# Patient Record
(demographics unavailable — no encounter records)

---

## 2024-10-22 NOTE — ASSESSMENT
[FreeTextEntry1] : FibroScan/ Vibration Controlled Transient Elastography (VCTE) Report   PROBE SIZE: M   INDICATION: NAFLD/KOROMA   REFERRING PHYSICIAN: Dr. Guillermina Awad     PROCEDURE:   Patient was NPO for 4 hours prior to procedure. After providing oral explanations of the procedure to the patient, patient was placed in supine position with right arm in maximum abduction to allow optimal exposure of right lateral abdomen. Patient abdomen was briefly assessed to identify to the terminus of the xyphoid process. The ideal target testing spot was located mid-line and lateral to this point. To acquire proper signals, the skin to liver capsule distance was accessed with the FibroScan probe. Patient was instructed to breathe normally and to abstain from sudden movements during the procedure. Ten shear waves were produced and measurements of the speed of each wave evaluated. Patient tolerated the procedure well and was discharged without incident.     FINDINGS:   - Median shear wave speed of 1.77 meters/second.   - This corresponds to a median Liver Stiffness Score of 9.4 kPa.   - IQR/med 30%   -  dB/m   RESULTS based on  reference scales:   Findings inconclusive. Consider MR Elastography for further evaluation if clinically indicated.   The results regarding fibrosis stage and/or steatosis grade are based on current published scientific literature and the provided patient's diagnosis. Any further medical or surgical intervention should be made by considering the overall medical condition of the patient.   Please note transient elastography does not directly measure liver fibrosis and fat content, therefore over-estimation of liver fibrosis may be observed in several conditions, including but not limited to: liver congestion, active hepatitis, ascites, mass within the liver. Clinical correlation is necessary, and liver stiffness readings need to be interpreted with consideration to these potential confounding factors.

## 2024-12-31 NOTE — ASSESSMENT
[FreeTextEntry1] : Krystina is a 62 F who is presenting for follow up visit for MASLD and liver cysts.  1. Liver cysts: given size on MRI patient to follow up with surgeon to assess if cysts need to be removed -Follow up already scheduled  2. MASH: chronic liver disease largely unremarkable. MR elastography without any e/o fibrosis -Continue lifestyle modifications -Plan to repat MR elastography Q1-2 years   Follow up in 6 months for repeat LFTs

## 2024-12-31 NOTE — HISTORY OF PRESENT ILLNESS
[FreeTextEntry1] : Krystina is a 62 F who is presenting for follow up visit for MASLD and liver cysts. Since last visit she had a fibroscan which was inconclusive due to high IQR, likely related to liver cysts. She then underwent and MR elastography which showed hepatic steatosis with no liver stiffness. She has overall been well, some fatigue. She is to follow up with a surgeon for her liver cysts and also found to have a hemorrhagic renal cyst for which she is following up with a urologist. She is otherwise doing well.

## 2025-01-15 NOTE — ASSESSMENT
[FreeTextEntry1] : Urine studies are ordered to screen for microhematuria. Discussed with patient at this time her creatinine is normal. Will follow up patient's renal cysts with annual ultrasounds.

## 2025-01-15 NOTE — HISTORY OF PRESENT ILLNESS
[FreeTextEntry1] : Patient presents with a complaint of bilateral renal cysts. Patient followed up with her surgeon for her liver cysts and was found to have renal cysts and a left hemorrhagic renal cyst on MRI with and without contrast. She is currently asymptomatic and has no urinary complaints.

## 2025-01-23 NOTE — DISCUSSION/SUMMARY
[FreeTextEntry1] : 62-year-old female with PAF status post RFCA PVAI (5/2024) who is doing well, not on AAD -[id number] 0 not on oac, encouraged weight loss -If patient has recurrence of A-fib we can consider Tikosyn -follow with dr Harrison  Patient expressed understanding of the discussion and recommendations. I spent a total of 45 minutes on the encounter evaluating and discussing treatment options with the patient, as well as counseling and coordination of care as stated above. [EKG obtained to assist in diagnosis and management of assessed problem(s)] : EKG obtained to assist in diagnosis and management of assessed problem(s)

## 2025-01-23 NOTE — HISTORY OF PRESENT ILLNESS
[FreeTextEntry1] : 62-year-old female with symptomatic PAF RVR episodes despite being on Multaq subsequently amiodarone (no longer taking any AAD). Patient underwent RFCA PVAI (5.29.24). Prior TTE shows normal LVEF. Zio from 8.2024 14d monitoring showed SR, no AF, rare APC, PVC. Occasionally feels very brief palpitations lasting few seconds, otherwise doing well. No sustained palpitations, cp, sob, dizziness, remains in SR. Denies htn, dm, cad.  chadsvasc 0 1.23.25 ecg sr 80 bpm 8.15.24 ecg shows SR 75 bpm 6.25.24 ecg SR 70 bpm

## 2025-02-06 NOTE — PHYSICAL EXAM
[Normal Appearance] : normal appearance [Well Groomed] : well groomed [General Appearance - In No Acute Distress] : no acute distress [Edema] : no peripheral edema [Respiration, Rhythm And Depth] : normal respiratory rhythm and effort [Exaggerated Use Of Accessory Muscles For Inspiration] : no accessory muscle use [Abdomen Soft] : soft [Abdomen Tenderness] : non-tender [Costovertebral Angle Tenderness] : no ~M costovertebral angle tenderness [Urinary Bladder Findings] : the bladder was normal on palpation [Normal Station and Gait] : the gait and station were normal for the patient's age [] : no rash [No Focal Deficits] : no focal deficits [Oriented To Time, Place, And Person] : oriented to person, place, and time [Affect] : the affect was normal [Mood] : the mood was normal [No Palpable Adenopathy] : no palpable adenopathy [de-identified] : atrophic changes consistent with age noted

## 2025-02-06 NOTE — END OF VISIT
[FreeTextEntry3] : Recommendations: Patient will try Gemtesa 75mg daily for 2 weeks. She will start Oxybutynin 10mg daily for the 2 weeks following completion of Gemtesa.  She will return for a Urodynamic study.

## 2025-02-06 NOTE — HISTORY OF PRESENT ILLNESS
[FreeTextEntry1] : SHARATH VILLARREAL is a 62 year -old female presenting for evaluation of hematuria, pyuria, urinary frequency and urgency, which has been intermittent but chronic. Her cystoscopy showed dilated urethral stenosis and she is willing to try other options.

## 2025-02-06 NOTE — ADDENDUM
[FreeTextEntry1] : I, Nidhi El, acted as a scribe on behalf of Dr. Bautista 02/06/2025.   All medical record entries made by the Scribe were at my, Dr. Bautista, direction and personally dictated by me on 02/06/2025. I have reviewed the chart and agree that the record accurately reflects my personal performance of the history, physical exam, assessment, and plan. I have also personally directed, reviewed, and agreed with the chart.

## 2025-07-07 NOTE — DISCUSSION/SUMMARY
[FreeTextEntry1] : 1) pap performed 2) as per patient she is up to date with mammo, DEXA, colon cancer screening 3) rx for TV sono issued for further eval of fibroid uterus noted on 1/2025 CT  will f/u pending receipt of TV sono and any abnormal pap results.

## 2025-07-07 NOTE — PHYSICAL EXAM
[Appropriately responsive] : appropriately responsive [Alert] : alert [No Acute Distress] : no acute distress [No Lymphadenopathy] : no lymphadenopathy [Oriented x3] : oriented x3 [Examination Of The Breasts] : a normal appearance [No Discharge] : no discharge [No Masses] : no breast masses were palpable [Labia Majora] : normal [Labia Minora] : normal [Atrophy] : atrophy [Normal] : normal [Uterine Adnexae] : non-palpable [FreeTextEntry5] : retroverte? unable to see os

## 2025-07-07 NOTE — HISTORY OF PRESENT ILLNESS
[Currently In Menopause] : currently in menopause [Previously active] : previously active [TextBox_4] : Krystina is a 62 y/o  (twins) who presents today for an annual exam.  She had a CT of the abdomen and pelvis with and without contrast on  25 which incidentally noted a fibroid uterus. Rx for TV sono issued today for further eval [Mammogramdate] : 2025 @ [facility] [BoneDensityDate] : 2025 [TextBox_37] : osteopenia  [ColonoscopyDate] : 2024 [TextBox_43] : Shelia